# Patient Record
(demographics unavailable — no encounter records)

---

## 2024-10-09 NOTE — DISCUSSION/SUMMARY
[FreeTextEntry1] : 18 YR MALE FERSUSANA FROM  [facility]  FOR A PFT and  evaluation.--Reports H/o general seasonal allergies-   pft 2023  august 15 --Evidence  of obstructive lung defect--presence of bronchodilator response in fFEV1--------THSI IS SUGGESTIVE OF HYPERACTIEV AIRWAYS DISORDER  1------ hyperreactive airways ---------PFT   SEPT 2023--- shows mild obstructive lung defect with partial reversibility in FEV1 on postbronchodilator challenge-----however there is an improvement as compared to the previous PFT done on August 2, 2023  AGAIN THE PFT APRIL 2024 shows much more improvement in FEV1 as compared to the PFT of 2023 2 HYPERACTIEV AIRWAYS DISEASE- USE - SYMBICORT (budesonide/formoterol fumarate dihydrate ...MDI   80/4.5 mcg 2 inhalations. PRN   3 CXR  4 CONSIDER SLEEP STUDY f/u AS NECESSARY

## 2024-10-09 NOTE — REVIEW OF SYSTEMS
[Fever] : no fever [Dry Eyes] : no dry eyes [Sputum] : no sputum [Chest Discomfort] : no chest discomfort [Hay Fever] : no hay fever [Nocturia] : no nocturia [Arthralgias] : no arthralgias [Anemia] : no anemia [Headache] : no headache [Obesity] : no obesity

## 2024-10-09 NOTE — HISTORY OF PRESENT ILLNESS
[TextBox_4] : 19 YR MALE EDISON FROM  [facility]  FOR A PFT and  evaluation. Reports H/o general seasonal allergies-     No history of fever chills Reiger chest pain hemoptysis no history of any nasal polyps no history of any chronic thromboembolic disease liver disease or autoimmune processes--------  past h/o jaw surgery fro snoring 2021-----  pft 2023  august 15 --Evidence  of obstructive lung defect--presence of bronchodilator response in fFEV1-------- PFT   SEPT 2023--- shows mild obstructive lung defect with partial reversibility in FEV1 on postbronchodilator challenge  SEPT 2023----feels better today not on any medications PFT   SEPT 2023--- shows mild obstructive lung defect with partial reversibility in FEV1 on postbronchodilator challenge  august 2024------ feels much better exercise tolerance has improved has been using Symbicort MDI----------  pft april 2024-----improved FEV1 as compared to the PFT of September 2023

## 2024-11-15 NOTE — DISCUSSION/SUMMARY
[FreeTextEntry1] : 18 YR MALE FERSUSANA FROM  [facility]  FOR A PFT and  evaluation.--Reports H/o general seasonal allergies-   pft 2023  august 15 --Evidence  of obstructive lung defect--presence of bronchodilator response in fFEV1--------THSI IS SUGGESTIVE OF HYPERACTIEV AIRWAYS DISORDER  1------ hyperreactive airways ---------PFT   SEPT 2023--- shows mild obstructive lung defect with partial reversibility in FEV1 on postbronchodilator challenge-----however there is an improvement as compared to the previous PFT done on August 2, 2023  AGAIN THE PFT APRIL 2024 shows much more improvement in FEV1 as compared to the PFT of 2023 2 HYPERACTIEV AIRWAYS DISEASE- USE - SYMBICORT (budesonide/formoterol fumarate dihydrate ...MDI   80/4.5 mcg 2 inhalations. PRN   3 CXR  4 CONSIDER SLEEP STUDY f/u AS NECESSARY

## 2024-11-15 NOTE — HISTORY OF PRESENT ILLNESS
[TextBox_4] : 19 YR MALE EDISON FROM  [facility]  FOR A PFT and  evaluation. Reports H/o general seasonal allergies-     No history of fever chills Reiger chest pain hemoptysis no history of any nasal polyps no history of any chronic thromboembolic disease liver disease or autoimmune processes--------  past h/o jaw surgery fro snoring 2021-----  pft 2023  august 15 --Evidence  of obstructive lung defect--presence of bronchodilator response in fFEV1-------- PFT   SEPT 2023--- shows mild obstructive lung defect with partial reversibility in FEV1 on postbronchodilator challenge  SEPT 2023----feels better today not on any medications PFT   SEPT 2023--- shows mild obstructive lung defect with partial reversibility in FEV1 on postbronchodilator challenge  august 2024------ feels much better exercise tolerance has improved has been using Symbicort MDI----------  pft april 2024-----improved FEV1 as compared to the PFT of September 2023

## 2025-04-21 NOTE — ADDENDUM
[FreeTextEntry1] : This note was written by Farida Walker on 04/15/2025 acting solely as a scribe for Dr. Bryson Carlton.   All medical record entries made by the Scribe were at my, Dr. Bryson Carlton, direction and personally dictated by me on 04/15/2025. I have personally reviewed the chart and agree that the record accurately reflects my personal performance of the history, physical exam, assessment and plan.

## 2025-04-21 NOTE — HISTORY OF PRESENT ILLNESS
[de-identified] : 20-year-old male presenting for initial evaluation of right shin pain that started on 4/10/25.  The pain is mostly at the medial aspect of the right lower leg. He was running about a mile and a half under 12 minutes and started to limp due to right shin pain. He has been resting which has not alleviated the pain. The pain is worse at the end of the day after walking all day. He does not require the use of pain medication. he has tried resting and ice with some relief.

## 2025-04-21 NOTE — HISTORY OF PRESENT ILLNESS
[de-identified] : 20-year-old male presenting for initial evaluation of right shin pain that started on 4/10/25.  The pain is mostly at the medial aspect of the right lower leg. He was running about a mile and a half under 12 minutes and started to limp due to right shin pain. He has been resting which has not alleviated the pain. The pain is worse at the end of the day after walking all day. He does not require the use of pain medication. he has tried resting and ice with some relief.

## 2025-04-21 NOTE — PHYSICAL EXAM
[de-identified] : Right Lower Extremity Exam:  Skin: Clean, dry, intact Inspection: No obvious malalignment, no masses, no swelling. Pulses: 2+ DP/PT pulses ROM: Full knee ROM, full ankle ROM Tenderness: +posterior medial distal third tibial border pain Stability: Ankle A/P stable, Knee stable varus/valgus/drawer Strength: 5/5 Q/H/TA/GS/EHL, without atrophy Neuro: Intact to light touch throughout, DTR's normal Additional tests: None [de-identified] : The following radiographs were ordered and read by me during this patients visit. I reviewed each radiograph in detail with the patient and discussed the findings as highlighted below.   3 views of the right tib/fib were obtained, 04/15/2025, that show no acute fracture or dislocation. There is no degenerative change seen. There is no gross malalignment. No significant other obvious osseous abnormality, otherwise unremarkable.

## 2025-04-21 NOTE — DISCUSSION/SUMMARY
[de-identified] : 21 y/o male with right LE pain.  The patient presents with an overuse load injury to the right tibia. The patient's symptoms are vague diffuse pain along the right tibia. I discussed the risk factors for this injury includes running on uneven surfaces, poor technique, improper running shoes, increase in activity, and over pronation. The pain is typically caused from a periostitis and inflammatory reaction. The patient's radiographs do not show evidence of stress reaction. Differential diagnosis includes medial tibial stress syndrome, stress fracture, exertional compartment syndrome, tendinopathy, peripheral nerve entrapment, or lumbar radiculopathy.   I discussed that treatment typically involves nonoperative activity modification with decreased activity, frequency and intensity, cross training exercise, avoiding hills with shoe modification and potentially orthotics. Physical therapy can improve symptoms by strengthening calf inversion/eversion. Ice/NSAID's can also be used to decrease symptomatic pain. Operative treatment is rarely indicated and is typically reserved for failed nonoperative treatment with variable results.  Discussed the use of MRI imaging to ensure no stress injury to the bone. This will better delineate next steps and return to activity.   Recommendations: MRI Rx given. Conservative care & observation, this includes rest/activity avoidance until less symptomatic with subsequent gradual return to full activity as tolerated. Patient may also use OTC NSAID's or acetaminophen as tolerated, with application of ice to the area 2-3x daily for 20 minutes after periods of activity.  Follow-up after MRI

## 2025-04-21 NOTE — PHYSICAL EXAM
[de-identified] : Right Lower Extremity Exam:  Skin: Clean, dry, intact Inspection: No obvious malalignment, no masses, no swelling. Pulses: 2+ DP/PT pulses ROM: Full knee ROM, full ankle ROM Tenderness: +posterior medial distal third tibial border pain Stability: Ankle A/P stable, Knee stable varus/valgus/drawer Strength: 5/5 Q/H/TA/GS/EHL, without atrophy Neuro: Intact to light touch throughout, DTR's normal Additional tests: None [de-identified] : The following radiographs were ordered and read by me during this patients visit. I reviewed each radiograph in detail with the patient and discussed the findings as highlighted below.   3 views of the right tib/fib were obtained, 04/15/2025, that show no acute fracture or dislocation. There is no degenerative change seen. There is no gross malalignment. No significant other obvious osseous abnormality, otherwise unremarkable.

## 2025-04-21 NOTE — DISCUSSION/SUMMARY
[de-identified] : 21 y/o male with right LE pain.  The patient presents with an overuse load injury to the right tibia. The patient's symptoms are vague diffuse pain along the right tibia. I discussed the risk factors for this injury includes running on uneven surfaces, poor technique, improper running shoes, increase in activity, and over pronation. The pain is typically caused from a periostitis and inflammatory reaction. The patient's radiographs do not show evidence of stress reaction. Differential diagnosis includes medial tibial stress syndrome, stress fracture, exertional compartment syndrome, tendinopathy, peripheral nerve entrapment, or lumbar radiculopathy.   I discussed that treatment typically involves nonoperative activity modification with decreased activity, frequency and intensity, cross training exercise, avoiding hills with shoe modification and potentially orthotics. Physical therapy can improve symptoms by strengthening calf inversion/eversion. Ice/NSAID's can also be used to decrease symptomatic pain. Operative treatment is rarely indicated and is typically reserved for failed nonoperative treatment with variable results.  Discussed the use of MRI imaging to ensure no stress injury to the bone. This will better delineate next steps and return to activity.   Recommendations: MRI Rx given. Conservative care & observation, this includes rest/activity avoidance until less symptomatic with subsequent gradual return to full activity as tolerated. Patient may also use OTC NSAID's or acetaminophen as tolerated, with application of ice to the area 2-3x daily for 20 minutes after periods of activity.  Follow-up after MRI

## 2025-04-25 NOTE — HISTORY OF PRESENT ILLNESS
[de-identified] : 20-year-old male presenting for follow up of evaluation of right LE pain that started on 4/10/25.  MRI R tib/fib suggest stress fracture of the tibial plateau, and evidence of stress injury to the medial knee (incompletely imaged). He had the knee reimaged and is here to review results. The pain has improved since last visit is mostly at the medial aspect of the right lower leg. He was running about a mile and a half under 12 minutes and started to limp due to right shin pain. He has been resting which has not alleviated the pain. The pain is worse at the end of the day after walking all day. He does not require the use of pain medication. he has tried resting and ice with some relief.

## 2025-04-25 NOTE — DISCUSSION/SUMMARY
[de-identified] : 21 y/o male with right LE pain.  The patient presents with an overuse load injury to the right tibia as well as knee. The patient's symptoms are vague diffuse pain along the right tibia, and reports no knee pain despite findings on both MRI tibia/knee.  I discussed that stress injuries are typically treated with restriction of all activity for 8-12 weeks. Discussed the risks of stress injuries with further injury and fractures becoming true fractures if proper healing does not occur.   Physical therapy can also sometimes be indicated to improve symptoms by strengthening. Ice/NSAID's can also be used to decrease symptomatic pain.   Recommendations: Complete activity restriction. Sneakers/Orthotics. Ice/rest/NSAIDs.  Follow-up 8 weeks

## 2025-04-25 NOTE — HISTORY OF PRESENT ILLNESS
[de-identified] : 20-year-old male presenting for follow up of evaluation of right LE pain that started on 4/10/25.  MRI R tib/fib suggest stress fracture of the tibial plateau, and evidence of stress injury to the medial knee (incompletely imaged). He had the knee reimaged and is here to review results. The pain has improved since last visit is mostly at the medial aspect of the right lower leg. He was running about a mile and a half under 12 minutes and started to limp due to right shin pain. He has been resting which has not alleviated the pain. The pain is worse at the end of the day after walking all day. He does not require the use of pain medication. he has tried resting and ice with some relief.

## 2025-04-25 NOTE — ADDENDUM
[FreeTextEntry1] : This note was written by Farida Walker on 04/24/2025 acting solely as a scribe for Dr. Bryson Carlton.   All medical record entries made by the Scribe were at my, Dr. Bryson Carlton, direction and personally dictated by me on 04/24/2025. I have personally reviewed the chart and agree that the record accurately reflects my personal performance of the history, physical exam, assessment and plan.

## 2025-04-25 NOTE — PHYSICAL EXAM
[de-identified] : Right Lower Extremity Exam:  Skin: Clean, dry, intact Inspection: No obvious malalignment, no masses, no swelling. Pulses: 2+ DP/PT pulses ROM: Full knee ROM, full ankle ROM Tenderness: +posterior medial distal third tibial border pain, no knee pain Stability: Ankle A/P stable, Knee stable varus/valgus/drawer Strength: 5/5 Q/H/TA/GS/EHL, without atrophy Neuro: Intact to light touch throughout, DTR's normal Additional tests: None [de-identified] : 3 views of the right tib/fib were obtained, 04/15/2025, that show no acute fracture or dislocation. There is no degenerative change seen. There is no gross malalignment. No significant other obvious osseous abnormality, otherwise unremarkable.   MRI R tib/fib dated 04/17/2025 suggest stress fracture of the tibial plateau, acute Fredericson grade 2 right medial tibial midshaft stress reaction. and evidence of stress injury to the medial knee (incompletely imaged).  MRI right knee dated 04/21/2025 shows nondisplaced medial tibial diaphyseal stress fracture with stress reaction/bone contusion of the medial femoral condyle. No overlying cartilage injury is identified. Mild to moderate patellar tendinopathy with associated stress reaction at the inferior pole of the patella, similar compared with 11/2023, and findings suggestive of patellofemoral instability.  We independently reviewed and discussed in detail the images and the radiologic reports with the patient.

## 2025-04-25 NOTE — DISCUSSION/SUMMARY
[de-identified] : 21 y/o male with right LE pain.  The patient presents with an overuse load injury to the right tibia as well as knee. The patient's symptoms are vague diffuse pain along the right tibia, and reports no knee pain despite findings on both MRI tibia/knee.  I discussed that stress injuries are typically treated with restriction of all activity for 8-12 weeks. Discussed the risks of stress injuries with further injury and fractures becoming true fractures if proper healing does not occur.   Physical therapy can also sometimes be indicated to improve symptoms by strengthening. Ice/NSAID's can also be used to decrease symptomatic pain.   Recommendations: Complete activity restriction. Sneakers/Orthotics. Ice/rest/NSAIDs.  Follow-up 8 weeks

## 2025-04-25 NOTE — PHYSICAL EXAM
[de-identified] : Right Lower Extremity Exam:  Skin: Clean, dry, intact Inspection: No obvious malalignment, no masses, no swelling. Pulses: 2+ DP/PT pulses ROM: Full knee ROM, full ankle ROM Tenderness: +posterior medial distal third tibial border pain, no knee pain Stability: Ankle A/P stable, Knee stable varus/valgus/drawer Strength: 5/5 Q/H/TA/GS/EHL, without atrophy Neuro: Intact to light touch throughout, DTR's normal Additional tests: None [de-identified] : 3 views of the right tib/fib were obtained, 04/15/2025, that show no acute fracture or dislocation. There is no degenerative change seen. There is no gross malalignment. No significant other obvious osseous abnormality, otherwise unremarkable.   MRI R tib/fib dated 04/17/2025 suggest stress fracture of the tibial plateau, acute Fredericson grade 2 right medial tibial midshaft stress reaction. and evidence of stress injury to the medial knee (incompletely imaged).  MRI right knee dated 04/21/2025 shows nondisplaced medial tibial diaphyseal stress fracture with stress reaction/bone contusion of the medial femoral condyle. No overlying cartilage injury is identified. Mild to moderate patellar tendinopathy with associated stress reaction at the inferior pole of the patella, similar compared with 11/2023, and findings suggestive of patellofemoral instability.  We independently reviewed and discussed in detail the images and the radiologic reports with the patient.

## 2025-06-25 NOTE — HISTORY OF PRESENT ILLNESS
[de-identified] : 20-year-old male presenting for follow up of evaluation of right LE pain that started on 4/10/25.  MRI R tib/fib suggest stress fracture of the tibial plateau, and evidence of stress injury to the medial knee (incompletely imaged). Patient rested from activities since last visit and reports 90% improvement of symptoms since then. He does not require the use of pain medication.

## 2025-06-25 NOTE — DISCUSSION/SUMMARY
[de-identified] : 19 y/o male with right LE pain.  The patient presents for follow-up of an overuse load injury to the right tibia as well as knee. Clinically, he is much better with no pain and reports of feeling 90% better, I again discussed that stress injuries are typically treated with restriction of all activity for 8-12 weeks. Discussed the risks of stress injuries with further injury and fractures becoming true fractures if proper healing does not occur. At this time, given his improvement discussed the ability o gradually return to activity at this time.  Recommendations: Gradual return to activity. Ice/NSAIDs.   Follow-up if return of symptoms.

## 2025-06-25 NOTE — ADDENDUM
[FreeTextEntry1] : This note was written by Farida Walker on 06/19/2025 acting solely as a scribe for Dr. Bryson Carlton.   All medical record entries made by the Scribe were at my, Dr. Bryson Carlton, direction and personally dictated by me on 06/19/2025. I have personally reviewed the chart and agree that the record accurately reflects my personal performance of the history, physical exam, assessment and plan.

## 2025-06-25 NOTE — PHYSICAL EXAM
[de-identified] : Right Lower Extremity Exam:  Skin: Clean, dry, intact Inspection: No obvious malalignment, no masses, no swelling. Pulses: 2+ DP/PT pulses ROM: Full knee ROM, full ankle ROM Tenderness: no posterior medial distal third tibial border pain, no knee pain Stability: Ankle A/P stable, Knee stable varus/valgus/drawer Strength: 5/5 Q/H/TA/GS/EHL, without atrophy Neuro: Intact to light touch throughout, DTR's normal Additional tests: None [de-identified] : 3 views of the right tib/fib were obtained, 04/15/2025, that show no acute fracture or dislocation. There is no degenerative change seen. There is no gross malalignment. No significant other obvious osseous abnormality, otherwise unremarkable.   MRI R tib/fib dated 04/17/2025 suggest stress fracture of the tibial plateau, acute Fredericson grade 2 right medial tibial midshaft stress reaction. and evidence of stress injury to the medial knee (incompletely imaged).  MRI right knee dated 04/21/2025 shows nondisplaced medial tibial diaphyseal stress fracture with stress reaction/bone contusion of the medial femoral condyle. No overlying cartilage injury is identified. Mild to moderate patellar tendinopathy with associated stress reaction at the inferior pole of the patella, similar compared with 11/2023, and findings suggestive of patellofemoral instability.  We independently reviewed and discussed in detail the images and the radiologic reports with the patient.

## 2025-06-25 NOTE — DISCUSSION/SUMMARY
[de-identified] : 21 y/o male with right LE pain.  The patient presents for follow-up of an overuse load injury to the right tibia as well as knee. Clinically, he is much better with no pain and reports of feeling 90% better, I again discussed that stress injuries are typically treated with restriction of all activity for 8-12 weeks. Discussed the risks of stress injuries with further injury and fractures becoming true fractures if proper healing does not occur. At this time, given his improvement discussed the ability o gradually return to activity at this time.  Recommendations: Gradual return to activity. Ice/NSAIDs.   Follow-up if return of symptoms.

## 2025-06-25 NOTE — PHYSICAL EXAM
[de-identified] : Right Lower Extremity Exam:  Skin: Clean, dry, intact Inspection: No obvious malalignment, no masses, no swelling. Pulses: 2+ DP/PT pulses ROM: Full knee ROM, full ankle ROM Tenderness: no posterior medial distal third tibial border pain, no knee pain Stability: Ankle A/P stable, Knee stable varus/valgus/drawer Strength: 5/5 Q/H/TA/GS/EHL, without atrophy Neuro: Intact to light touch throughout, DTR's normal Additional tests: None [de-identified] : 3 views of the right tib/fib were obtained, 04/15/2025, that show no acute fracture or dislocation. There is no degenerative change seen. There is no gross malalignment. No significant other obvious osseous abnormality, otherwise unremarkable.   MRI R tib/fib dated 04/17/2025 suggest stress fracture of the tibial plateau, acute Fredericson grade 2 right medial tibial midshaft stress reaction. and evidence of stress injury to the medial knee (incompletely imaged).  MRI right knee dated 04/21/2025 shows nondisplaced medial tibial diaphyseal stress fracture with stress reaction/bone contusion of the medial femoral condyle. No overlying cartilage injury is identified. Mild to moderate patellar tendinopathy with associated stress reaction at the inferior pole of the patella, similar compared with 11/2023, and findings suggestive of patellofemoral instability.  We independently reviewed and discussed in detail the images and the radiologic reports with the patient.

## 2025-06-25 NOTE — HISTORY OF PRESENT ILLNESS
[de-identified] : 20-year-old male presenting for follow up of evaluation of right LE pain that started on 4/10/25.  MRI R tib/fib suggest stress fracture of the tibial plateau, and evidence of stress injury to the medial knee (incompletely imaged). Patient rested from activities since last visit and reports 90% improvement of symptoms since then. He does not require the use of pain medication.